# Patient Record
Sex: FEMALE | Race: WHITE
[De-identification: names, ages, dates, MRNs, and addresses within clinical notes are randomized per-mention and may not be internally consistent; named-entity substitution may affect disease eponyms.]

---

## 2019-12-11 ENCOUNTER — HOSPITAL ENCOUNTER (EMERGENCY)
Dept: HOSPITAL 25 - ED | Age: 54
Discharge: HOME | End: 2019-12-11
Payer: COMMERCIAL

## 2019-12-11 VITALS — SYSTOLIC BLOOD PRESSURE: 163 MMHG | DIASTOLIC BLOOD PRESSURE: 86 MMHG

## 2019-12-11 DIAGNOSIS — Z88.2: ICD-10-CM

## 2019-12-11 DIAGNOSIS — R19.7: ICD-10-CM

## 2019-12-11 DIAGNOSIS — L50.9: ICD-10-CM

## 2019-12-11 DIAGNOSIS — R11.10: Primary | ICD-10-CM

## 2019-12-11 LAB
ALBUMIN SERPL BCG-MCNC: 4.1 G/DL (ref 3.2–5.2)
ALBUMIN/GLOB SERPL: 1.5 {RATIO} (ref 1–3)
ALP SERPL-CCNC: 42 U/L (ref 34–104)
ALT SERPL W P-5'-P-CCNC: 10 U/L (ref 7–52)
ANION GAP SERPL CALC-SCNC: 9 MMOL/L (ref 2–11)
AST SERPL-CCNC: 13 U/L (ref 13–39)
BASOPHILS # BLD AUTO: 0 10^3/UL (ref 0–0.2)
BUN SERPL-MCNC: 11 MG/DL (ref 6–24)
BUN/CREAT SERPL: 12.5 (ref 8–20)
CALCIUM SERPL-MCNC: 8.6 MG/DL (ref 8.6–10.3)
CHLORIDE SERPL-SCNC: 100 MMOL/L (ref 101–111)
EOSINOPHIL # BLD AUTO: 0 10^3/UL (ref 0–0.6)
GLOBULIN SER CALC-MCNC: 2.8 G/DL (ref 2–4)
GLUCOSE SERPL-MCNC: 140 MG/DL (ref 70–100)
HCO3 SERPL-SCNC: 27 MMOL/L (ref 22–32)
HCT VFR BLD AUTO: 43 % (ref 35–47)
HGB BLD-MCNC: 15.1 G/DL (ref 12–16)
LYMPHOCYTES # BLD AUTO: 0.9 10^3/UL (ref 1–4.8)
MCH RBC QN AUTO: 32 PG (ref 27–31)
MCHC RBC AUTO-ENTMCNC: 35 G/DL (ref 31–36)
MCV RBC AUTO: 92 FL (ref 80–97)
MONOCYTES # BLD AUTO: 0.2 10^3/UL (ref 0–0.8)
NEUTROPHILS # BLD AUTO: 7.2 10^3/UL (ref 1.5–7.7)
NRBC # BLD AUTO: 0 10^3/UL
NRBC BLD QL AUTO: 0
PLATELET # BLD AUTO: 307 10^3/UL (ref 150–450)
POTASSIUM SERPL-SCNC: 3.9 MMOL/L (ref 3.5–5)
PROT SERPL-MCNC: 6.9 G/DL (ref 6.4–8.9)
RBC # BLD AUTO: 4.68 10^6 /UL (ref 3.7–4.87)
SODIUM SERPL-SCNC: 136 MMOL/L (ref 135–145)
WBC # BLD AUTO: 8.3 10^3/UL (ref 3.5–10.8)

## 2019-12-11 PROCEDURE — 96361 HYDRATE IV INFUSION ADD-ON: CPT

## 2019-12-11 PROCEDURE — 83690 ASSAY OF LIPASE: CPT

## 2019-12-11 PROCEDURE — 86140 C-REACTIVE PROTEIN: CPT

## 2019-12-11 PROCEDURE — 36415 COLL VENOUS BLD VENIPUNCTURE: CPT

## 2019-12-11 PROCEDURE — 99282 EMERGENCY DEPT VISIT SF MDM: CPT

## 2019-12-11 PROCEDURE — 80053 COMPREHEN METABOLIC PANEL: CPT

## 2019-12-11 PROCEDURE — 85025 COMPLETE CBC W/AUTO DIFF WBC: CPT

## 2019-12-11 PROCEDURE — 96365 THER/PROPH/DIAG IV INF INIT: CPT

## 2019-12-11 NOTE — ED
Abdominal Pain/Female





- HPI Summary


HPI Summary: 


54 year old F presenting to [facility] accompanied by female  complains of 

abd pain, rash, nausea, and vomiting since 8:00 am this morning 12/11/19. Pt 

reports vomiting, diarrhea, erythema in right eye, abdominal cramping and 

burning, and a diffuse itchy rash that started 2 nights ago and has progressed 

from her axilla into the chest, neck, and back. Denies fever and urinary 

symptoms. No Abx use. SgHx of thyroidectomy a number of years ago. Sulfa 

allergy. Uses alcohol but no tobacco or drugs. The patient rates the pain 4/10 

in severity. Symptoms aggravated by nothing. Symptoms alleviated by nothing. 

Medications reviewed and allergies noted.





- History of Current Complaint


Chief Complaint: EDAbdPain


Stated Complaint: FLU SYMPTOMS PER PT


Time Seen by Provider: 12/11/19 15:14


Hx Obtained From: Patient


Onset/Duration: Gradual Onset, Lasting Days, Still Present


Timing: Constant


Severity Currently: Moderate


Pain Intensity: 6


Pain Scale Used: 0-10 Numeric


Location: Diffuse - across abd


Character: Burning, Cramping


Aggravating Factor(s): Nothing


Alleviating Factor(s): Nothing


Associated Signs and Symptoms: Positive: Nausea, Vomiting, Diarrhea, Other: - 

erythema in right eye, cramps and aches, and diffuse rash.  Negative: Fever, 

Urinary Symptoms


Allergies/Adverse Reactions: 


 Allergies











Allergy/AdvReac Type Severity Reaction Status Date / Time


 


MS Sulfa Antibiotics Allergy  Rash Verified 05/22/17 14:06





[Sulfa Antibiotics]     














PMH/Surg Hx/FS Hx/Imm Hx


Endocrine/Hematology History: Reports: Hx Thyroid Disease


   Denies: Hx Diabetes


Cardiovascular History: 


   Denies: Hx Hypercholesterolemia, Hx Hypertension


Respiratory History: 


   Denies: Hx Asthma


Sensory History: 


   Denies: Hx Legally Blind


EENT History: 


   Denies: Hx Deafness





- Cancer History


Hx Chemotherapy: No


Hx Radiation Therapy: No





- Surgical History


Surgical History: Yes


Surgery Procedure, Year, and Place: thyroidectomy


Infectious Disease History: No


Infectious Disease History: 


   Denies: Traveled Outside the [place] in Last 30 Days





- Family History


Known Family History: 


   Negative: Hypertension, Diabetes, Seizure Disorder





- Social History


Alcohol Use: Occasionally


Hx Substance Use: No


Substance Use Type: Reports: None


Hx Tobacco Use: No


Smoking Status (MU): Never Smoked Tobacco





Review of Systems


Positive: Other - aches and cramps .  Negative: Fever


Positive: Erythema - right eye


Positive: Abdominal Pain, Vomiting, Diarrhea, Nausea


Negative: dysuria


Positive: Rash - diffuse across body


All Other Systems Reviewed And Are Negative: Yes





Physical Exam





- Summary


Physical Exam Summary: 


Constitutional: Well-developed, Well-nourished, Alert. (-) Distressed


Skin: Urticaria noted on trunk, back, neck, Bilat LE, no intraoral rash


HENT: Normocephalic; Atraumatic, no oral mucosa 


Eyes: Conjunctiva normal


Neck: Musculoskeletal ROM normal neck. (-) JVD, (-) Stridor, (-) Tracheal 

deviation


Cardio: Rhythm regular, rate normal, Heart sounds normal; Intact distal pulses; 

Radial pulses are 2+ and symmetric. (-) Murmur


Pulmonary/Chest wall: Effort normal. (-) Respiratory distress, (-) Wheezes, (-) 

Rales


Abd: Soft, (+) diffuse tenderness that is mild in nature and worse in 

epigastrium, (-) Distension, (-) Guarding, (-) Rebound


Musculoskeletal: (-) Edema


Lymph: (-) Cervical adenopathy


Neuro: Alert, Oriented x3


Psych: Mood and affect Normal


Triage Information Reviewed: Yes


Vital Signs On Initial Exam: 


 Initial Vitals











Temp Pulse Resp BP Pulse Ox


 


 98.3 F   67   16   157/95   98 


 


 12/11/19 11:45  12/11/19 11:45  12/11/19 11:45  12/11/19 11:45  12/11/19 11:45











Vital Signs Reviewed: Yes





Procedures





- Sedation


Patient Received Moderate/Deep Sedation with Procedure: No





Diagnostics





- Vital Signs


 Vital Signs











  Temp Pulse Resp BP Pulse Ox


 


 12/11/19 13:48  99.0 F  82  16  164/99  98


 


 12/11/19 11:45  98.3 F  67  16  157/95  98














- Laboratory


Result Diagrams: 


 12/11/19 15:45





 12/11/19 15:45


Lab Statement: Any lab studies that have been ordered have been reviewed, and 

results considered in the medical decision making process.





Re-Evaluation





- Re-Evaluation


  ** First Eval


Re-Evaluation Time: 16:42


Change: Improved


Comment: Patient feels better.





Abdominal Pain Fem Course/Dx





- Course


Course Of Treatment: Patient is here with vomiting, diarrhea, abdominal cramping

, generalized urticaria.  Patient's symptoms sound and appear viral in nature.  

Patient had a benign abdominal exam with no specific areas of tenderness.  

Patient had no intraoral lesions concerning for Abad-Zeferino syndrome.  

Patient had blood performed which showed no elevation in CRP or WBC count.  I 

do not believe this represents appendicitis but patient was given very strict 

return percussion for Abad-Zeferino and appendicitis.  Patient was given IV 

fluids, Zofran with improvement in her symptoms.





- Diagnoses


Provider Diagnoses: 


 Vomiting, Diarrhea, Urticaria








Discharge ED





- Sign-Out/Discharge


Documenting (check all that apply): Patient Departure - discharge 





- Discharge Plan


Condition: Stable


Disposition: HOME


Prescriptions: 


Ondansetron TAB* [Zofran 4 MG Tab*] 4 mg PO Q8HR PRN #12 tab


 PRN Reason: Vomiting


Patient Education Materials:  Urticaria (ED), Acute Nausea and Vomiting (ED), 

Acute Diarrhea (ED)


Referrals: 


Jhoan Willis MD [Primary Care Provider] - 3 Days


Additional Instructions: 


Please follow up with your primary care provider in 2-3 days. Stay hydrated 

with Pedialyte, take nausea medications as prescribed. Return if you have any 

trouble breathing, notice a rash inside your mouth, have worsening abdominal 

pain, can't tolerate anything by mouth for 12-16 hours, or you do not make 

urine for 12-16 hours.





- Billing Disposition and Condition


Condition: STABLE


Disposition: Home





- Attestation Statements


Document Initiated by Teddy: Yes


Documenting Scribe: Janessa Sherman


Provider For Whom Teddy is Documenting (Include Credential): Dr. Munir Garzon MD


Scribe Attestation: 


Janessa ZAFAR scribed for Dr. Munir Garzon MD on 12/11/19 at 1909. 


Scribe Documentation Reviewed: Yes


Provider Attestation: 


The documentation as recorded by the Janessa curry accurately reflects 

the service I personally performed and the decisions made by me, Dr. Munir Garzon MD


Status of Teddy Document: Viewed

## 2019-12-12 ENCOUNTER — HOSPITAL ENCOUNTER (EMERGENCY)
Dept: HOSPITAL 25 - ED | Age: 54
Discharge: HOME | End: 2019-12-12
Payer: COMMERCIAL

## 2019-12-12 VITALS — DIASTOLIC BLOOD PRESSURE: 86 MMHG | SYSTOLIC BLOOD PRESSURE: 134 MMHG

## 2019-12-12 DIAGNOSIS — Z88.2: ICD-10-CM

## 2019-12-12 DIAGNOSIS — E03.9: ICD-10-CM

## 2019-12-12 DIAGNOSIS — Z79.890: ICD-10-CM

## 2019-12-12 DIAGNOSIS — B34.9: Primary | ICD-10-CM

## 2019-12-12 LAB
ALBUMIN SERPL BCG-MCNC: 3.8 G/DL (ref 3.2–5.2)
ALBUMIN/GLOB SERPL: 1.5 {RATIO} (ref 1–3)
ALP SERPL-CCNC: 37 U/L (ref 34–104)
ALT SERPL W P-5'-P-CCNC: 9 U/L (ref 7–52)
AMYLASE SERPL-CCNC: 28 U/L (ref 29–103)
ANION GAP SERPL CALC-SCNC: 7 MMOL/L (ref 2–11)
AST SERPL-CCNC: 10 U/L (ref 13–39)
BASOPHILS # BLD AUTO: 0 10^3/UL (ref 0–0.2)
BUN SERPL-MCNC: 13 MG/DL (ref 6–24)
BUN/CREAT SERPL: 17.1 (ref 8–20)
CALCIUM SERPL-MCNC: 7.9 MG/DL (ref 8.6–10.3)
CHLORIDE SERPL-SCNC: 101 MMOL/L (ref 101–111)
EOSINOPHIL # BLD AUTO: 0 10^3/UL (ref 0–0.6)
GLOBULIN SER CALC-MCNC: 2.6 G/DL (ref 2–4)
GLUCOSE SERPL-MCNC: 136 MG/DL (ref 70–100)
HCO3 SERPL-SCNC: 26 MMOL/L (ref 22–32)
HCT VFR BLD AUTO: 39 % (ref 35–47)
HGB BLD-MCNC: 13.6 G/DL (ref 12–16)
INR PPP/BLD: 1.15 (ref 0.82–1.09)
LYMPHOCYTES # BLD AUTO: 1.3 10^3/UL (ref 1–4.8)
MCH RBC QN AUTO: 32 PG (ref 27–31)
MCHC RBC AUTO-ENTMCNC: 35 G/DL (ref 31–36)
MCV RBC AUTO: 91 FL (ref 80–97)
MONOCYTES # BLD AUTO: 0.3 10^3/UL (ref 0–0.8)
NEUTROPHILS # BLD AUTO: 5.1 10^3/UL (ref 1.5–7.7)
NRBC # BLD AUTO: 0 10^3/UL
NRBC BLD QL AUTO: 0.1
PLATELET # BLD AUTO: 318 10^3/UL (ref 150–450)
POTASSIUM SERPL-SCNC: 3.7 MMOL/L (ref 3.5–5)
PROT SERPL-MCNC: 6.4 G/DL (ref 6.4–8.9)
RBC # BLD AUTO: 4.27 10^6 /UL (ref 3.7–4.87)
SODIUM SERPL-SCNC: 134 MMOL/L (ref 135–145)
WBC # BLD AUTO: 6.7 10^3/UL (ref 3.5–10.8)

## 2019-12-12 PROCEDURE — 81003 URINALYSIS AUTO W/O SCOPE: CPT

## 2019-12-12 PROCEDURE — 74177 CT ABD & PELVIS W/CONTRAST: CPT

## 2019-12-12 PROCEDURE — 83690 ASSAY OF LIPASE: CPT

## 2019-12-12 PROCEDURE — 96361 HYDRATE IV INFUSION ADD-ON: CPT

## 2019-12-12 PROCEDURE — 80053 COMPREHEN METABOLIC PANEL: CPT

## 2019-12-12 PROCEDURE — 82150 ASSAY OF AMYLASE: CPT

## 2019-12-12 PROCEDURE — 99283 EMERGENCY DEPT VISIT LOW MDM: CPT

## 2019-12-12 PROCEDURE — 96374 THER/PROPH/DIAG INJ IV PUSH: CPT

## 2019-12-12 PROCEDURE — 85610 PROTHROMBIN TIME: CPT

## 2019-12-12 PROCEDURE — 85025 COMPLETE CBC W/AUTO DIFF WBC: CPT

## 2019-12-12 PROCEDURE — 83605 ASSAY OF LACTIC ACID: CPT

## 2019-12-12 PROCEDURE — 96375 TX/PRO/DX INJ NEW DRUG ADDON: CPT

## 2019-12-12 PROCEDURE — 36415 COLL VENOUS BLD VENIPUNCTURE: CPT

## 2019-12-12 NOTE — ED
GI/ HPI





- HPI Summary


HPI Summary: 





Pt is a 55 y/o F presenting to the ED with a chief complaint of GI sx initially 

onset 12/9/19 with abd pain. She developed diarrhea on 12/10, then nausea/

vomiting later that night. She reports pain in her throat, dec. frequency of 

urination, as well as a whole-body rash. She denies fever.





- History of Current Complaint


Chief Complaint: EDAbdPain


Time Seen by Provider: 12/12/19 03:22


Stated Complaint: ABD PAIN,DEHYDRATED,RASH PER PT


Hx Obtained From: Patient


Onset/Duration: Started Days Ago, Still Present


Timing: Constant, Lasting Days


Severity: Moderate


Current Severity: Severe


Pain Intensity: 8


Location of Pain: Epigastric


Associated Signs and Symptoms: Positive: Nausea, Vomiting, Diarrhea, Abdominal 

Pain.  Negative: Fever


Aggravating Factor(s): Nothing


Alleviating Factor(s): Nothing





- Allergy/Home Medications


Allergies/Adverse Reactions: 


 Allergies











Allergy/AdvReac Type Severity Reaction Status Date / Time


 


Sulfa (Sulfonamide Allergy  Unknown Verified 12/12/19 00:09





Antibiotics)   Reaction  





   Details  














PMH/Surg Hx/FS Hx/Imm Hx


Previously Healthy: Yes


Endocrine/Hematology History: Reports: Hx Thyroid Disease


   Denies: Hx Diabetes


Cardiovascular History: 


   Denies: Hx Hypercholesterolemia, Hx Hypertension


Respiratory History: 


   Denies: Hx Asthma


Sensory History: 


   Denies: Hx Legally Blind, Hx Deafness


Opthamlomology History: 


   Denies: Hx Legally Blind





- Cancer History


Hx Chemotherapy: No


Hx Radiation Therapy: No





- Surgical History


Surgery Procedure, Year, and Place: thyroidectomy


Infectious Disease History: No


Infectious Disease History: Reports: Traveled Outside the [place] in Last 30 Days





- Family History


Known Family History: 


   Negative: Hypertension, Diabetes, Seizure Disorder





- Social History


Alcohol Use: Occasionally


Hx Substance Use: No


Substance Use Type: Reports: None


Hx Tobacco Use: No


Smoking Status (MU): Never Smoked Tobacco





Review of Systems





- ROS Summary


Review of Systems Summary: 





 Home Medications











 Medication  Instructions  Recorded  Confirmed  Type


 


Chloroquine TAB* [Aralen TAB*] 500 mg PO WEEKLY PRN 05/18/17 05/22/17 History


 


Levothyroxine TAB* [Synthroid TAB*] 137 mcg PO QAM 05/18/17 05/22/17 History


 


Ondansetron TAB* [Zofran 4 MG Tab*] 4 mg PO Q8HR PRN #12 tab 12/11/19  Rx











Negative: Fever


Positive: Abdominal Pain, Vomiting, Diarrhea, Nausea


Positive: frequency - dec.


Positive: Rash


All Other Systems Reviewed And Are Negative: Yes





Physical Exam





- Summary


Physical Exam Summary: 





General: Well-developed, Well-nourished female. No acute distress.


HEENT: Normocephalic, Atraumatic. 


              Eyes: Conjuctiva normal, PERRL.


              Oropharynx: Clear, mucous membranes moist, (-) exudates. 


Neck: Soft, FROM, (-) lymphadenopathy, (-) thyromegaly, (-) JVD.


Cardiovascular: Normal sinus rhythm, (-) murmur.


Lungs: Clear to auscultation bilaterally (-) wheezes, (-) rales, (-) rhonchi.


Abdomen: Soft, mild epigastric pain, non-distended, (-) organomegaly, normal 

bowel sounds.


Back: (-) CVA tenderness


Extremities: No edema.


Skin: Warm, dry, Diffuse erythematous macules all over her skin.


Neuro: Alert and oriented x3, no focal deficits.


Psychiatric: Mood normal, affect normal.





Triage Information Reviewed: Yes


Vital Signs On Initial Exam: 


 Initial Vitals











Temp Pulse Resp BP Pulse Ox


 


 98.8 F   75   16   145/102   96 


 


 12/12/19 00:05  12/12/19 00:05  12/12/19 00:05  12/12/19 00:05  12/12/19 00:05











Vital Signs Reviewed: Yes





Procedures





- Sedation


Patient Received Moderate/Deep Sedation with Procedure: No





Diagnostics





- Vital Signs


 Vital Signs











  Temp Pulse Resp BP Pulse Ox


 


 12/12/19 02:00  99.9 F  68  16  165/97  99


 


 12/12/19 00:05  98.8 F  75  16  145/102  96














- Laboratory


Result Diagrams: 


 12/12/19 03:48





 12/12/19 03:48


Lab Statement: Any lab studies that have been ordered have been reviewed, and 

results considered in the medical decision making process.





- CT


  ** CT a/p


CT Interpretation Completed By: Radiologist


Summary of CT Findings: 1. There is a left adrenal nodule noted measuring 

approximately 1.6 CM. Adrenal protocol imaging may be performed for further 

evaluation.  2. There is a large amount of free fluid noted within the pelvis. 

Pelvic sonography may be performed for further evaluation.  ED physician has 

reviewed this report.





Re-Evaluation





- Re-Evaluation


  ** 1st re-eval


Re-Evaluation Time: 06:19


Change: Improved


Comment: I have discussed results with the patient and abd pain is resolved. 

Discussed symptoms that warrant immediate return to ED.





GIGU Course/Dx





- Course


Course Of Treatment: 54-year-old female with abdominal pain.  Macular rash.  

Patient seen in the emergency room earlier.  Diagnosed with a viral process.  

Patient states she continues to vomit.  The rash is somewhat better with the 

Benadryl.  Abdominal pain quite severe at times.  No further diarrhea.  Patient 

had laboratories and CAT scan done.  CAT scan did demonstrate some fluid in the 

pelvis.  Patient has no tenderness or symptoms in the lower abdomen.  Patient 

to be discharged home.  Zofran from earlier visit.  Clear fluids recommended.  

Follow-up within 48 hours for recheck.  Follow-up sooner for any worsening 

symptoms.





- Diagnoses


Provider Diagnoses: 


 Viral syndrome








Discharge ED





- Sign-Out/Discharge


Documenting (check all that apply): Patient Departure





- Discharge Plan


Condition: Stable


Disposition: HOME


Patient Education Materials:  Viral Syndrome (ED)


Referrals: 


Jhoan Willis MD [Primary Care Provider] - 


Additional Instructions: 


Please follow up with your primary care physician within three days. Please 

return to ED for any new or worsening symptoms. 





- Billing Disposition and Condition


Condition: STABLE


Disposition: Home





- Attestation Statements


Document Initiated by Teddy: Yes


Documenting Scribe: Stacy Kramer


Provider For Whom Teddy is Documenting (Include Credential): Stephanie Mcmanus MD.


Scribe Attestation: 


Stacy ZAFAR, scribed for Stephanie Mcmanus MD. on 12/12/19 at 0643. 


Scribe Documentation Reviewed: Yes


Provider Attestation: 


The documentation as recorded by the Stacy curry accurately 

reflects the service I personally performed and the decisions made by me, 

Stephanie Mcmanus MD.


Status of Scribe Document: Viewed